# Patient Record
Sex: MALE | Race: WHITE | NOT HISPANIC OR LATINO
[De-identification: names, ages, dates, MRNs, and addresses within clinical notes are randomized per-mention and may not be internally consistent; named-entity substitution may affect disease eponyms.]

---

## 2023-08-22 ENCOUNTER — NON-APPOINTMENT (OUTPATIENT)
Age: 28
End: 2023-08-22

## 2023-08-22 PROBLEM — Z00.00 ENCOUNTER FOR PREVENTIVE HEALTH EXAMINATION: Status: ACTIVE | Noted: 2023-08-22

## 2023-08-28 ENCOUNTER — APPOINTMENT (OUTPATIENT)
Dept: ORTHOPEDIC SURGERY | Facility: CLINIC | Age: 28
End: 2023-08-28
Payer: MEDICARE

## 2023-08-28 VITALS — WEIGHT: 230 LBS | HEIGHT: 69 IN | BODY MASS INDEX: 34.07 KG/M2

## 2023-08-28 DIAGNOSIS — Z82.49 FAMILY HISTORY OF ISCHEMIC HEART DISEASE AND OTHER DISEASES OF THE CIRCULATORY SYSTEM: ICD-10-CM

## 2023-08-28 DIAGNOSIS — Z78.9 OTHER SPECIFIED HEALTH STATUS: ICD-10-CM

## 2023-08-28 DIAGNOSIS — Z80.9 FAMILY HISTORY OF MALIGNANT NEOPLASM, UNSPECIFIED: ICD-10-CM

## 2023-08-28 DIAGNOSIS — Z77.22 CONTACT WITH AND (SUSPECTED) EXPOSURE TO ENVIRONMENTAL TOBACCO SMOKE (ACUTE) (CHRONIC): ICD-10-CM

## 2023-08-28 DIAGNOSIS — Z72.3 LACK OF PHYSICAL EXERCISE: ICD-10-CM

## 2023-08-28 DIAGNOSIS — F32.A DEPRESSION, UNSPECIFIED: ICD-10-CM

## 2023-08-28 PROCEDURE — 99204 OFFICE O/P NEW MOD 45 MIN: CPT

## 2023-08-28 PROCEDURE — 73564 X-RAY EXAM KNEE 4 OR MORE: CPT | Mod: 50

## 2023-08-28 NOTE — HISTORY OF PRESENT ILLNESS
[de-identified] : Mr. Nelson is a 29 y/o  who comes in for evaluation for RIGHT knee pain that started on 8/22/2023 when he was squatting at the gym.  He was warming up doing squats lighter weight for him about 225 pounds and as he squatted he felt the pain in the anterior left knee. Pain is 5 out of 10 and constant and localized and can be achy and dull worse with bending the knee or squatting to sit. He is not taking any medication for pain.  He will get occasional sharp pain is 8 out of 10 that occurred particularly when it happened. He has a constant dull ache in the knee.  It gets better with rest not putting pressure on the knee and worse if he is standing or squatting like getting into a chair. He has not had any work-up or treatment for this. His left knee he had an ACL reconstruction with hamstring autograft he believes and partial meniscectomy in 2016.  His knee had recovered and he got back to sports and activity.  It has been that he keep constantly since his surgery.

## 2023-08-28 NOTE — REVIEW OF SYSTEMS
[Joint Pain] : joint pain [Joint Stiffness] : joint stiffness [Feeling Tired] : fatigue [Depression] : depression [Sleep Disturbances] : ~T sleep disturbances [Suicidal] : suicidal [Negative] : Heme/Lymph [Joint Swelling] : no joint swelling [de-identified] : He denies currently being suicidal but has a history of depression and has not filled his medication in the past month and needs to get a new doctor because the clinic he was using had closed.

## 2023-08-28 NOTE — PHYSICAL EXAM
[LE] : Sensory: Intact in bilateral lower extremities [Normal RLE] : Right Lower Extremity: No scars, rashes, lesions, ulcers, skin intact [Normal LLE] : Left Lower Extremity: No scars, rashes, lesions, ulcers, skin intact [Normal Touch] : sensation intact for touch [Normal] : Oriented to person, place, and time, insight and judgement were intact and the affect was normal [de-identified] : Bilateral knees Normal gait. He can sit in the chair in the office flexing the knee almost a few degrees without any significant pain and get up. No edema, ecchymosis, erythema in either knee.  No effusion. Right knee is tender patella facets and mildly on the patella tendon.  Nontender quad tendon. Intact extensor mechanism straight leg raise bilaterally. Knee range of motion is with about 5 degrees hyperextension on the right versus a couple degrees on the left and flexion to 135 degrees. Negative anterior and posterior drawer.  Normal varus and valgus laxity.  Ia Lachman. Negative Lilia. Normal neurovascular exam. [de-identified] : Overweight [de-identified] : X-rays bilateral knees weightbearing 4 views were ordered, performed and reviewed with the patient today and showed postsurgical changes left knee consistent with ACL reconstruction with a button on the femoral side and no visible fixation on the tibial side but tunnels are visible. There is mild narrowing medial joint space with very small osteophytes at the medial joint line and also at the patellofemoral joint. In the right knee there is no degenerative changes and is unremarkable. Patella baja.

## 2023-08-28 NOTE — ASSESSMENT
[FreeTextEntry1] : 29 y/o status post left knee ACL reconstruction and partial meniscectomy about 7 years ago with some dull chronic pain in his left knee likely related to some early posttraumatic osteoarthritis.  The knee feels stable and graft feels intact. He comes in more for acute pain in the right knee that started last week when doing weighted squats.  Exam is with tenderness on the knee patella facets.  No evidence of any significant tear of the extensor mechanism.  He could have chondral injury. There is no swelling or locking and pain seems to be decreasing.  I recommended taking ibuprofen 600 mg twice a day with meals and doing some home exercises primarily straight leg raises and stretching.  He should not play soccer any running and no heavy squats right now. Heat and ice. If its not fully better in the next 3 weeks he should come in for follow-up evaluation. In the left knee if he is having more pain symptoms he should also come in for further evaluation.

## 2023-09-18 ENCOUNTER — APPOINTMENT (OUTPATIENT)
Dept: ORTHOPEDIC SURGERY | Facility: CLINIC | Age: 28
End: 2023-09-18
Payer: COMMERCIAL

## 2023-09-18 DIAGNOSIS — M17.32 UNILATERAL POST-TRAUMATIC OSTEOARTHRITIS, LEFT KNEE: ICD-10-CM

## 2023-09-18 DIAGNOSIS — M25.562 PAIN IN LEFT KNEE: ICD-10-CM

## 2023-09-18 DIAGNOSIS — M25.561 PAIN IN RIGHT KNEE: ICD-10-CM

## 2023-09-18 DIAGNOSIS — M17.5 OTHER UNILATERAL SECONDARY OSTEOARTHRITIS OF KNEE: ICD-10-CM

## 2023-09-18 DIAGNOSIS — G89.29 PAIN IN LEFT KNEE: ICD-10-CM

## 2023-09-18 PROCEDURE — 99214 OFFICE O/P EST MOD 30 MIN: CPT

## 2023-09-18 RX ORDER — DICLOFENAC SODIUM 50 MG/1
50 TABLET, DELAYED RELEASE ORAL TWICE DAILY
Qty: 30 | Refills: 0 | Status: ACTIVE | COMMUNITY
Start: 2023-09-18 | End: 1900-01-01

## 2023-10-30 ENCOUNTER — APPOINTMENT (OUTPATIENT)
Dept: ORTHOPEDIC SURGERY | Facility: CLINIC | Age: 28
End: 2023-10-30